# Patient Record
Sex: MALE | Race: WHITE | NOT HISPANIC OR LATINO
[De-identification: names, ages, dates, MRNs, and addresses within clinical notes are randomized per-mention and may not be internally consistent; named-entity substitution may affect disease eponyms.]

---

## 2017-05-28 ENCOUNTER — TRANSCRIPTION ENCOUNTER (OUTPATIENT)
Age: 27
End: 2017-05-28

## 2018-07-21 ENCOUNTER — TRANSCRIPTION ENCOUNTER (OUTPATIENT)
Age: 28
End: 2018-07-21

## 2018-09-18 ENCOUNTER — TRANSCRIPTION ENCOUNTER (OUTPATIENT)
Age: 28
End: 2018-09-18

## 2019-04-29 ENCOUNTER — EMERGENCY (EMERGENCY)
Facility: HOSPITAL | Age: 29
LOS: 1 days | Discharge: HOME | End: 2019-04-29
Attending: EMERGENCY MEDICINE
Payer: MEDICAID

## 2019-04-29 VITALS
TEMPERATURE: 98 F | DIASTOLIC BLOOD PRESSURE: 90 MMHG | HEART RATE: 63 BPM | SYSTOLIC BLOOD PRESSURE: 153 MMHG | WEIGHT: 250 LBS | HEIGHT: 70 IN | RESPIRATION RATE: 20 BRPM | OXYGEN SATURATION: 100 %

## 2019-04-29 LAB
APPEARANCE UR: CLEAR — SIGNIFICANT CHANGE UP
BACTERIA # UR AUTO: ABNORMAL
BILIRUB UR-MCNC: NEGATIVE — SIGNIFICANT CHANGE UP
COLOR SPEC: YELLOW — SIGNIFICANT CHANGE UP
DIFF PNL FLD: ABNORMAL
EPI CELLS # UR: SIGNIFICANT CHANGE UP
GLUCOSE UR QL: NEGATIVE MG/DL — SIGNIFICANT CHANGE UP
KETONES UR-MCNC: ABNORMAL
LEUKOCYTE ESTERASE UR-ACNC: NEGATIVE — SIGNIFICANT CHANGE UP
NITRITE UR-MCNC: NEGATIVE — SIGNIFICANT CHANGE UP
PH UR: 5 — SIGNIFICANT CHANGE UP (ref 5–8)
PROT UR-MCNC: 30 MG/DL
RBC CASTS # UR COMP ASSIST: ABNORMAL /HPF (ref 0–4)
SP GR SPEC: 1.02 — SIGNIFICANT CHANGE UP (ref 1.01–1.02)
UROBILINOGEN FLD QL: 1 MG/DL
WBC UR QL: SIGNIFICANT CHANGE UP

## 2019-04-29 PROCEDURE — 96372 THER/PROPH/DIAG INJ SC/IM: CPT

## 2019-04-29 PROCEDURE — 87086 URINE CULTURE/COLONY COUNT: CPT

## 2019-04-29 PROCEDURE — 74176 CT ABD & PELVIS W/O CONTRAST: CPT | Mod: 26

## 2019-04-29 PROCEDURE — 99284 EMERGENCY DEPT VISIT MOD MDM: CPT | Mod: 25

## 2019-04-29 PROCEDURE — 81001 URINALYSIS AUTO W/SCOPE: CPT

## 2019-04-29 PROCEDURE — 99284 EMERGENCY DEPT VISIT MOD MDM: CPT

## 2019-04-29 PROCEDURE — 74176 CT ABD & PELVIS W/O CONTRAST: CPT

## 2019-04-29 RX ORDER — ONDANSETRON 8 MG/1
4 TABLET, FILM COATED ORAL ONCE
Qty: 0 | Refills: 0 | Status: COMPLETED | OUTPATIENT
Start: 2019-04-29 | End: 2019-04-29

## 2019-04-29 RX ORDER — KETOROLAC TROMETHAMINE 30 MG/ML
15 SYRINGE (ML) INJECTION ONCE
Qty: 0 | Refills: 0 | Status: DISCONTINUED | OUTPATIENT
Start: 2019-04-29 | End: 2019-04-29

## 2019-04-29 RX ADMIN — ONDANSETRON 4 MILLIGRAM(S): 8 TABLET, FILM COATED ORAL at 17:47

## 2019-04-29 RX ADMIN — Medication 15 MILLIGRAM(S): at 17:47

## 2019-04-29 NOTE — ED STATDOCS - CLINICAL SUMMARY MEDICAL DECISION MAKING FREE TEXT BOX
Pain improving, no nausea now, one episode of vomiting, will do CT renal, obtains labs, and re-assess

## 2019-04-29 NOTE — ED STATDOCS - PHYSICAL EXAMINATION
Gen: No acute distress, non toxic  HEENT: Mucous membranes moist, pink conjunctivae, EOMI  CV: RRR  Resp: CTAB, normal rate and effort  GI: Abdomen soft, NT, ND. No rebound, no guarding. No CVA tenderness  Neuro: A&O x 3, moving all 4 extremities  : testicle non tender, positive Cremaster, no abnormality

## 2019-04-29 NOTE — ED ADULT TRIAGE NOTE - CHIEF COMPLAINT QUOTE
back pain while in the shower, no injury, radiates to the groin.  lt side flank pain, possible kidney stone

## 2019-04-29 NOTE — ED STATDOCS - NS ED ROS FT
ROS: +back pain +abdominal pain, + L testicle pain +vomiting. No fever/chills. No chest pain. No SOB/cough. No N/D. No dysuria/frequency.  No headache. No Dizziness. No rashes. No numbness/weakness.

## 2019-04-29 NOTE — ED STATDOCS - OBJECTIVE STATEMENT
28 y/o M pt with no significant PMHx presents to the ED c/o sudden back pain onset about 45 minutes ago. The pain radiates to his abdomen and L testicle. He has not taken anything for the pain. Pt took a shower to relieve the pain which provided minimal to no relief. Nothing worsens or relieves his pain. Reports vomiting. Denies blood in urine, passing stone, fever, chills, CP, SOB, nausea, diarrhea or HA. No further complaints at this time.

## 2019-04-29 NOTE — ED STATDOCS - ATTENDING CONTRIBUTION TO CARE
Marko: I performed a face to face bedside interview with patient regarding history of present illness, review of symptoms and past medical history. I completed an independent physical exam and ordered tests/medications as needed.  I have discussed patient's plan of care with advanced care provider. The advanced care provider assisted in  executing the discussed plan. I was available for any questions or issues that may have arose during the execution of the plan of care.

## 2019-04-30 LAB
CULTURE RESULTS: NO GROWTH — SIGNIFICANT CHANGE UP
SPECIMEN SOURCE: SIGNIFICANT CHANGE UP

## 2020-07-26 ENCOUNTER — TRANSCRIPTION ENCOUNTER (OUTPATIENT)
Age: 30
End: 2020-07-26

## 2020-09-03 ENCOUNTER — TRANSCRIPTION ENCOUNTER (OUTPATIENT)
Age: 30
End: 2020-09-03

## 2021-01-09 NOTE — ED ADULT NURSE NOTE - OBJECTIVE STATEMENT
----- Message from Norm Vegas MD sent at 1/9/2021  8:06 AM CST -----  Please inform the patient that the urine culture is not consistent with infection.  She should finish the antibiotic and follow up with the pcp if symptoms persist or if no improvement.  Thank you.   Pt is here with c/o L flank pain that started this morning while in the shower.  Pt also c/o L testicle pain.  Denies N/v denies urinary symptoms.  Pt has no hx of kidney stones but mother and sister both have.

## 2021-03-05 ENCOUNTER — TRANSCRIPTION ENCOUNTER (OUTPATIENT)
Age: 31
End: 2021-03-05

## 2021-06-18 ENCOUNTER — TRANSCRIPTION ENCOUNTER (OUTPATIENT)
Age: 31
End: 2021-06-18

## 2021-10-03 ENCOUNTER — TRANSCRIPTION ENCOUNTER (OUTPATIENT)
Age: 31
End: 2021-10-03

## 2022-07-27 ENCOUNTER — EMERGENCY (EMERGENCY)
Facility: HOSPITAL | Age: 32
LOS: 1 days | Discharge: HOME | End: 2022-07-27
Attending: EMERGENCY MEDICINE
Payer: MEDICAID

## 2022-07-27 VITALS
HEART RATE: 65 BPM | DIASTOLIC BLOOD PRESSURE: 82 MMHG | WEIGHT: 225.09 LBS | HEIGHT: 70 IN | SYSTOLIC BLOOD PRESSURE: 133 MMHG | TEMPERATURE: 98 F | RESPIRATION RATE: 16 BRPM | OXYGEN SATURATION: 97 %

## 2022-07-27 PROCEDURE — 73130 X-RAY EXAM OF HAND: CPT | Mod: 26,RT

## 2022-07-27 PROCEDURE — 12002 RPR S/N/AX/GEN/TRNK2.6-7.5CM: CPT

## 2022-07-27 PROCEDURE — 99284 EMERGENCY DEPT VISIT MOD MDM: CPT | Mod: 25

## 2022-07-27 RX ORDER — OXYCODONE AND ACETAMINOPHEN 5; 325 MG/1; MG/1
1 TABLET ORAL ONCE
Refills: 0 | Status: DISCONTINUED | OUTPATIENT
Start: 2022-07-27 | End: 2022-07-27

## 2022-07-27 RX ORDER — TETANUS TOXOID, REDUCED DIPHTHERIA TOXOID AND ACELLULAR PERTUSSIS VACCINE, ADSORBED 5; 2.5; 8; 8; 2.5 [IU]/.5ML; [IU]/.5ML; UG/.5ML; UG/.5ML; UG/.5ML
0.5 SUSPENSION INTRAMUSCULAR ONCE
Refills: 0 | Status: COMPLETED | OUTPATIENT
Start: 2022-07-27 | End: 2022-07-27

## 2022-07-27 RX ORDER — CEPHALEXIN 500 MG
500 CAPSULE ORAL ONCE
Refills: 0 | Status: COMPLETED | OUTPATIENT
Start: 2022-07-27 | End: 2022-07-27

## 2022-07-27 RX ADMIN — OXYCODONE AND ACETAMINOPHEN 1 TABLET(S): 5; 325 TABLET ORAL at 22:51

## 2022-07-27 RX ADMIN — TETANUS TOXOID, REDUCED DIPHTHERIA TOXOID AND ACELLULAR PERTUSSIS VACCINE, ADSORBED 0.5 MILLILITER(S): 5; 2.5; 8; 8; 2.5 SUSPENSION INTRAMUSCULAR at 22:51

## 2022-07-27 RX ADMIN — Medication 500 MILLIGRAM(S): at 22:51

## 2022-07-27 NOTE — ED ADULT TRIAGE NOTE - CHIEF COMPLAINT QUOTE
BIBEMS from home after accidently hitting right first and second finger with an axe while chopping wood.  Deep laceration noted to both fingers with fatty tissue exposed.  Bleeding controlled at this time. No use of blood thinners.

## 2022-07-28 PROBLEM — Z78.9 OTHER SPECIFIED HEALTH STATUS: Chronic | Status: ACTIVE | Noted: 2019-04-29

## 2022-07-28 PROCEDURE — 73130 X-RAY EXAM OF HAND: CPT

## 2022-07-28 PROCEDURE — 99283 EMERGENCY DEPT VISIT LOW MDM: CPT | Mod: 25

## 2022-07-28 PROCEDURE — 12042 INTMD RPR N-HF/GENIT2.6-7.5: CPT

## 2022-07-28 PROCEDURE — 90715 TDAP VACCINE 7 YRS/> IM: CPT

## 2022-07-28 PROCEDURE — 90471 IMMUNIZATION ADMIN: CPT | Mod: XU

## 2022-07-28 RX ORDER — CEPHALEXIN 500 MG
1 CAPSULE ORAL
Qty: 28 | Refills: 0
Start: 2022-07-28 | End: 2022-08-03

## 2022-07-28 NOTE — ED PROVIDER NOTE - CARE PROVIDER_API CALL
Katia Stiles (DO)  Orthopaedic Surgery  [address] [place] [address]  Phone: [phone]  Fax: [phone]  Follow Up Time:    Katia Stiles (DO)  Orthopaedic Surgery  [address] [place] [address]  Phone: [phone]  Fax: [phone]  Follow Up Time:     Vivienne Ca)  Orthopedics  [address] [place] [address], [facility] [address]  [place] [address]  Phone: [phone]  Fax: [phone]  Follow Up Time: 4-6 Days

## 2022-07-28 NOTE — ED PROVIDER NOTE - WR ORDER ID 1
[id number] Complex Repair And Rhombic Flap Text: The defect edges were debeveled with a #15 scalpel blade.  The primary defect was closed partially with a complex linear closure.  Given the location of the remaining defect, shape of the defect and the proximity to free margins a rhombic flap was deemed most appropriate for complete closure of the defect.  Using a sterile surgical marker, an appropriate advancement flap was drawn incorporating the defect and placing the expected incisions within the relaxed skin tension lines where possible.    The area thus outlined was incised deep to adipose tissue with a #15 scalpel blade.  The skin margins were undermined to an appropriate distance in all directions utilizing iris scissors.

## 2022-07-28 NOTE — ED PROVIDER NOTE - CARE PROVIDERS DIRECT ADDRESSES
,DirectAddress_Unknown ,DirectAddress_Unknown,[email or web address]

## 2022-07-28 NOTE — ED PROVIDER NOTE - OBJECTIVE STATEMENT
32 year old male with no med hx presented to ED c/o hand injury. Pt states his brother was trying to remove a piece of metal pipe from a tree, accidentally hit his hand with an axe. he admits to decrease in ROM, pain. unknown last tetanus

## 2022-07-28 NOTE — ED PROVIDER NOTE - NS ED ATTENDING STATEMENT MOD
This was a shared visit with the SAT[facility]. I reviewed and verified the documentation and independently performed the documented:

## 2022-07-28 NOTE — ED PROVIDER NOTE - PATIENT PORTAL LINK FT
You can access the FollowMyHealth Patient Portal offered by [facility] by registering at the following website: [email or web address]. By joining [facility]’s FollowMyHealth portal, you will also be able to view your health information using other applications (apps) compatible with our system.

## 2022-07-28 NOTE — ED PROVIDER NOTE - PHYSICAL EXAMINATION
+ 2nd finger deep laceration 7mm across proximal phalynx unable to extend/flex   + 3rd finger 4mm lac

## 2022-07-28 NOTE — ED PROVIDER NOTE - PROVIDER TOKENS
PROVIDER:[TOKEN:[[id number]]] PROVIDER:[TOKEN:[[id number]]],PROVIDER:[TOKEN:[[id number]],FOLLOWUP:[4-6 Days]]

## 2022-07-29 RX ORDER — OXYCODONE AND ACETAMINOPHEN 5; 325 MG/1; MG/1
1 TABLET ORAL
Qty: 8 | Refills: 0
Start: 2022-07-29 | End: 2022-07-30

## 2022-08-02 ENCOUNTER — APPOINTMENT (OUTPATIENT)
Dept: ORTHOPEDIC SURGERY | Facility: CLINIC | Age: 32
End: 2022-08-02

## 2022-08-02 VITALS
BODY MASS INDEX: 37.03 KG/M2 | DIASTOLIC BLOOD PRESSURE: 91 MMHG | HEART RATE: 70 BPM | HEIGHT: 69 IN | SYSTOLIC BLOOD PRESSURE: 147 MMHG | WEIGHT: 250 LBS

## 2022-08-02 DIAGNOSIS — F17.200 NICOTINE DEPENDENCE, UNSPECIFIED, UNCOMPLICATED: ICD-10-CM

## 2022-08-02 DIAGNOSIS — Z78.9 OTHER SPECIFIED HEALTH STATUS: ICD-10-CM

## 2022-08-02 PROCEDURE — 99205 OFFICE O/P NEW HI 60 MIN: CPT | Mod: 25

## 2022-08-02 PROCEDURE — 29125 APPL SHORT ARM SPLINT STATIC: CPT | Mod: RT

## 2022-08-02 RX ORDER — CEPHALEXIN 500 MG/1
500 CAPSULE ORAL
Qty: 28 | Refills: 0 | Status: ACTIVE | COMMUNITY
Start: 2022-07-28

## 2022-08-02 RX ORDER — OXYCODONE AND ACETAMINOPHEN 5; 325 MG/1; MG/1
5-325 TABLET ORAL
Qty: 8 | Refills: 0 | Status: ACTIVE | COMMUNITY
Start: 2022-07-29

## 2022-08-03 ENCOUNTER — OUTPATIENT (OUTPATIENT)
Dept: OUTPATIENT SERVICES | Facility: HOSPITAL | Age: 32
LOS: 1 days | End: 2022-08-03
Payer: MEDICAID

## 2022-08-03 VITALS
SYSTOLIC BLOOD PRESSURE: 124 MMHG | WEIGHT: 300.05 LBS | HEIGHT: 69 IN | OXYGEN SATURATION: 98 % | HEART RATE: 70 BPM | DIASTOLIC BLOOD PRESSURE: 74 MMHG | TEMPERATURE: 97 F | RESPIRATION RATE: 18 BRPM

## 2022-08-03 DIAGNOSIS — S62.610A DISPLACED FRACTURE OF PROXIMAL PHALANX OF RIGHT INDEX FINGER, INITIAL ENCOUNTER FOR CLOSED FRACTURE: ICD-10-CM

## 2022-08-03 DIAGNOSIS — Z13.89 ENCOUNTER FOR SCREENING FOR OTHER DISORDER: ICD-10-CM

## 2022-08-03 DIAGNOSIS — Z29.9 ENCOUNTER FOR PROPHYLACTIC MEASURES, UNSPECIFIED: ICD-10-CM

## 2022-08-03 DIAGNOSIS — S66.901S: ICD-10-CM

## 2022-08-03 DIAGNOSIS — S66.991D: ICD-10-CM

## 2022-08-03 DIAGNOSIS — S62.610B DISPLACED FRACTURE OF PROXIMAL PHALANX OF RIGHT INDEX FINGER, INITIAL ENCOUNTER FOR OPEN FRACTURE: ICD-10-CM

## 2022-08-03 DIAGNOSIS — Z01.818 ENCOUNTER FOR OTHER PREPROCEDURAL EXAMINATION: ICD-10-CM

## 2022-08-03 DIAGNOSIS — S66.901D: ICD-10-CM

## 2022-08-03 LAB
A1C WITH ESTIMATED AVERAGE GLUCOSE RESULT: 4.8 % — SIGNIFICANT CHANGE UP (ref 4–5.6)
ANION GAP SERPL CALC-SCNC: 14 MMOL/L — SIGNIFICANT CHANGE UP (ref 5–17)
APTT BLD: 30.7 SEC — SIGNIFICANT CHANGE UP (ref 27.5–35.5)
BASOPHILS # BLD AUTO: 0.1 K/UL — SIGNIFICANT CHANGE UP (ref 0–0.2)
BASOPHILS NFR BLD AUTO: 1 % — SIGNIFICANT CHANGE UP (ref 0–2)
BUN SERPL-MCNC: 8.9 MG/DL — SIGNIFICANT CHANGE UP (ref 8–20)
CALCIUM SERPL-MCNC: 10.2 MG/DL — SIGNIFICANT CHANGE UP (ref 8.4–10.5)
CHLORIDE SERPL-SCNC: 103 MMOL/L — SIGNIFICANT CHANGE UP (ref 98–107)
CO2 SERPL-SCNC: 23 MMOL/L — SIGNIFICANT CHANGE UP (ref 22–29)
CREAT SERPL-MCNC: 0.72 MG/DL — SIGNIFICANT CHANGE UP (ref 0.5–1.3)
EGFR: 125 ML/MIN/1.73M2 — SIGNIFICANT CHANGE UP
EOSINOPHIL # BLD AUTO: 0.35 K/UL — SIGNIFICANT CHANGE UP (ref 0–0.5)
EOSINOPHIL NFR BLD AUTO: 3.4 % — SIGNIFICANT CHANGE UP (ref 0–6)
ESTIMATED AVERAGE GLUCOSE: 91 MG/DL — SIGNIFICANT CHANGE UP (ref 68–114)
GLUCOSE SERPL-MCNC: 81 MG/DL — SIGNIFICANT CHANGE UP (ref 70–99)
HCT VFR BLD CALC: 46.2 % — SIGNIFICANT CHANGE UP (ref 39–50)
HGB BLD-MCNC: 16.8 G/DL — SIGNIFICANT CHANGE UP (ref 13–17)
IMM GRANULOCYTES NFR BLD AUTO: 0.3 % — SIGNIFICANT CHANGE UP (ref 0–1.5)
INR BLD: 1.08 RATIO — SIGNIFICANT CHANGE UP (ref 0.88–1.16)
LYMPHOCYTES # BLD AUTO: 1.94 K/UL — SIGNIFICANT CHANGE UP (ref 1–3.3)
LYMPHOCYTES # BLD AUTO: 19.1 % — SIGNIFICANT CHANGE UP (ref 13–44)
MCHC RBC-ENTMCNC: 32.2 PG — SIGNIFICANT CHANGE UP (ref 27–34)
MCHC RBC-ENTMCNC: 36.4 GM/DL — HIGH (ref 32–36)
MCV RBC AUTO: 88.7 FL — SIGNIFICANT CHANGE UP (ref 80–100)
MONOCYTES # BLD AUTO: 0.66 K/UL — SIGNIFICANT CHANGE UP (ref 0–0.9)
MONOCYTES NFR BLD AUTO: 6.5 % — SIGNIFICANT CHANGE UP (ref 2–14)
NEUTROPHILS # BLD AUTO: 7.07 K/UL — SIGNIFICANT CHANGE UP (ref 1.8–7.4)
NEUTROPHILS NFR BLD AUTO: 69.7 % — SIGNIFICANT CHANGE UP (ref 43–77)
PLATELET # BLD AUTO: 270 K/UL — SIGNIFICANT CHANGE UP (ref 150–400)
POTASSIUM SERPL-MCNC: 4 MMOL/L — SIGNIFICANT CHANGE UP (ref 3.5–5.3)
POTASSIUM SERPL-SCNC: 4 MMOL/L — SIGNIFICANT CHANGE UP (ref 3.5–5.3)
PROTHROM AB SERPL-ACNC: 12.5 SEC — SIGNIFICANT CHANGE UP (ref 10.5–13.4)
RBC # BLD: 5.21 M/UL — SIGNIFICANT CHANGE UP (ref 4.2–5.8)
RBC # FLD: 11.9 % — SIGNIFICANT CHANGE UP (ref 10.3–14.5)
SODIUM SERPL-SCNC: 140 MMOL/L — SIGNIFICANT CHANGE UP (ref 135–145)
WBC # BLD: 10.15 K/UL — SIGNIFICANT CHANGE UP (ref 3.8–10.5)
WBC # FLD AUTO: 10.15 K/UL — SIGNIFICANT CHANGE UP (ref 3.8–10.5)

## 2022-08-03 PROCEDURE — 85610 PROTHROMBIN TIME: CPT

## 2022-08-03 PROCEDURE — 87641 MR-STAPH DNA AMP PROBE: CPT

## 2022-08-03 PROCEDURE — 36415 COLL VENOUS BLD VENIPUNCTURE: CPT

## 2022-08-03 PROCEDURE — U0003: CPT

## 2022-08-03 PROCEDURE — 85025 COMPLETE CBC W/AUTO DIFF WBC: CPT

## 2022-08-03 PROCEDURE — 85730 THROMBOPLASTIN TIME PARTIAL: CPT

## 2022-08-03 PROCEDURE — U0005: CPT

## 2022-08-03 PROCEDURE — 87640 STAPH A DNA AMP PROBE: CPT

## 2022-08-03 PROCEDURE — G0463: CPT

## 2022-08-03 PROCEDURE — 83036 HEMOGLOBIN GLYCOSYLATED A1C: CPT

## 2022-08-03 PROCEDURE — 80048 BASIC METABOLIC PNL TOTAL CA: CPT

## 2022-08-03 RX ORDER — MUPIROCIN 20 MG/G
1 OINTMENT TOPICAL
Qty: 1 | Refills: 0
Start: 2022-08-03 | End: 2022-08-07

## 2022-08-03 NOTE — H&P PST ADULT - ASSESSMENT
This is a  morbidly obese 32 year old RHD male, accompanied by Sister, in NAD denies PMH presents today for PST. Patient accidentally cut his finger at home with a Ax on 22. He was seen in [facility] ER, imaging reportedly showed index finger proximal phalanx fracture. The wound was cared for and sutured. He was placed on oral antibiotics and placed in a splint and referred to orthopedics for follow-up. He denies fevers or chills.  Denies drainage or erythema to wound. Denies numbness and tingling. Currently stitches are open to ai and splint in use. Now scheduled for open reduction internal fixation right index finger proximal phalanx fracture, possible extensor tendon repair on 22 with Dr. Ca.     CAPRINI SCORE    AGE RELATED RISK FACTORS                                                             [ ] Age 41-60 years                                            (1 Point)  [ ] Age: 61-74 years                                           (2 Points)                 [ ] Age= 75 years                                                (3 Points)             DISEASE RELATED RISK FACTORS                                                       [ ] Edema in the lower extremities                 (1 Point)                     [ ] Varicose veins                                               (1 Point)                                 [X ] BMI > 25 Kg/m2                                            (1 Point)                                  [ ] Serious infection (ie PNA)                            (1 Point)                     [ ] Lung disease ( COPD, Emphysema)            (1 Point)                                                                          [ ] Acute myocardial infarction                         (1 Point)                  [ ] Congestive heart failure (in the previous month)  (1 Point)         [ ] Inflammatory bowel disease                            (1 Point)                  [ ] Central venous access, PICC or Port               (2 points)       (within the last month)                                                                [ ] Stroke (in the previous month)                        (5 Points)    [ ] Previous or present malignancy                       (2 points)                                                                                                                                                         HEMATOLOGY RELATED FACTORS                                                         [ ] Prior episodes of VTE                                     (3 Points)                     [ ] Positive family history for VTE                      (3 Points)                  [ ] Prothrombin 39027 A                                     (3 Points)                     [ ] Factor V Leiden                                                (3 Points)                        [ ] Lupus anticoagulants                                      (3 Points)                                                           [ ] Anticardiolipin antibodies                              (3 Points)                                                       [ ] High homocysteine in the blood                   (3 Points)                                             [ ] Other congenital or acquired thrombophilia      (3 Points)                                                [ ] Heparin induced thrombocytopenia                  (3 Points)                                        MOBILITY RELATED FACTORS  [ ] Bed rest                                                         (1 Point)  [ ] Plaster cast                                                    (2 points)  [ ] Bed bound for more than 72 hours           (2 Points)    GENDER SPECIFIC FACTORS  [ ] Pregnancy or had a baby within the last month   (1 Point)  [ ] Post-partum < 6 weeks                                   (1 Point)  [ ] Hormonal therapy  or oral contraception   (1 Point)  [ ] History of pregnancy complications              (1 point)  [ ] Unexplained or recurrent              (1 Point)    OTHER RISK FACTORS                                           (1 Point)  [ X] BMI >40, smoking, diabetes requiring insulin, chemotherapy  blood transfusions and length of surgery over 2 hours    SURGERY RELATED RISK FACTORS  [ ]  Section within the last month     (1 Point)  [ ] Minor surgery                                                  (1 Point)  [ ] Arthroscopic surgery                                       (2 Points)  [X ] Planned major surgery lasting more            (2 Points)      than 45 minutes     [ ] Elective hip or knee joint replacement       (5 points)       surgery                                                TRAUMA RELATED RISK FACTORS  [ ] Fracture of the hip, pelvis, or leg                       (5 Points)  [ ] Spinal cord injury resulting in paralysis             (5 points)       (in the previous month)    [ ] Paralysis  (less than 1 month)                             (5 Points)  [ ] Multiple Trauma within 1 month                        (5 Points)    Total Score [  4      ]    Caprini Score 0-2: Low Risk, NO VTE prophylaxis required for most patients, encourage ambulation  Caprini Score 3-6: Moderate Risk , pharmacologic VTE prophylaxis is indicated for most patients (in the absence of contraindications)  Caprini Score Greater than or =7: High risk, pharmocologic VTE prophylaxis indicated for most patients (in the absence of contraindications)    OPIOID RISK TOOL    SON EACH BOX THAT APPLIES AND ADD TOTALS AT THE END    FAMILY HISTORY OF SUBSTANCE ABUSE                 FEMALE         MALE                                                Alcohol                             [  ]1 pt          [  ]3pts                                               Illegal Durgs                     [  ]2 pts        [  ]3pts                                               Rx Drugs                           [  ]4 pts        [  ]4 pts    PERSONAL HISTORY OF SUBSTANCE ABUSE                                                                                          Alcohol                             [  ]3 pts       [  ]3 pts                                               Illegal Drugs                     [  ]4 pts        [  ]4 pts                                               Rx Drugs                           [  ]5 pts        [  ]5 pts    AGE BETWEEN 16-45 YEARS                                      [  ]1 pt         [X  ]1 pt    HISTORY OF PREADOLESCENT   SEXUAL ABUSE                                                             [  ]3 pts        [  ]0pts    PSYCHOLOGICAL DISEASE                     ADD, OCD, Bipolar, Schizophrenia        [  ]2 pts         [  ]2 pts                      Depression                                               [  ]1 pt           [  ]1 pt           SCORING TOTAL   (add numbers and type here)              (*1**)                                     A score of 3 or lower indicated LOW risk for future opioid abuse  A score of 4 to 7 indicated moderate risk for future opioid abuse  A score of 8 or higher indicates a high risk for opioid abuse

## 2022-08-03 NOTE — H&P PST ADULT - ATTENDING COMMENTS
I examined the patient in presurgical holding with sister at bedside and reviewed the above H&P and relevant lab results.  Patient has right index finger displaced fracture and extensor laceration after an ax injury.  He was indicated for right index fracture ORIF and extensor tendon repair.  Risks, benefits, alternatives were discussed in detail with the patient in the office and he would like to proceed with surgery.  He had the opportunity to ask additional questions today and informed written consent was obtained from the patient.  Surgical side and site were confirmed and marked.  Boarded to OR.

## 2022-08-03 NOTE — H&P PST ADULT - LAB RESULTS AND INTERPRETATION
results pending results pending  8/3/22 19:53 All available labs noted as documented, all abnormal labs noted as documented. MRSA/MSSA, COVID PCR pending. Maciel MS, FNP-BC

## 2022-08-03 NOTE — H&P PST ADULT - PROBLEM SELECTOR PLAN 2
Labs and MRSA/MSSA performed.  Scheduled for open reduction internal fixation right index finger proximal phalanx fracture, possible extensor tendon repair on 8/5/22 with Dr. Ca.   Written and verbal instructions provided.  Patient educated on surgical scrub, COVID testing performed in PST, preadmission instructions, and day of procedure medications, verbalizes understanding, teach back method utilized.  Patient treated prophylactically for MRSA/MSSA  Patient instructed to stop ASA/Herbals or anti-inflammatory meds

## 2022-08-03 NOTE — H&P PST ADULT - MUSCULOSKELETAL COMMENTS
right hand and right index finger right hand warm, + sensation, edema noted, pain with movement of fingers

## 2022-08-03 NOTE — H&P PST ADULT - PROBLEM SELECTOR PLAN 1
Labs and MRSA/MSSA performed.  Scheduled for open reduction internal fixation right index finger proximal phalanx fracture, possible extensor tendon repair on 8/5/22 with Dr. Ca.   Written and verbal instructions provided.  Patient educated on surgical scrub, COVID testing performed in PST, liquids before surgery, preadmission instructions, and day of procedure medications, verbalizes understanding, teach back method utilized.  Patient treated prophylactically for MRSA/MSSA  Patient instructed to stop ASA/Herbals or anti-inflammatory meds

## 2022-08-03 NOTE — H&P PST ADULT - PROBLEM SELECTOR PLAN 3
CAP score 4 patient Moderate Risk,  SCDs ordered for day of procedure.  Surgical team to assess need for VTE prophylaxis

## 2022-08-03 NOTE — H&P PST ADULT - HISTORY OF PRESENT ILLNESS
32 year old RHD  accompanied by Sister, denies PMH presents today for PST. Patient accidentally cut his finger at home with a Ax on 7/27/22. He was seen in [facility] ER, imaging reportedly showed index finger proximal phalanx fracture. The wound was cared for and sutured. He was placed on oral antibiotics and placed in a splint and referred to orthopedics for follow-up. He denies fevers or chills.  Denies drainage or erythema to wound. Denies numbness and tingling. Currently stitches are open to ai and splint in use.      32 year old RHD  accompanied by Sister, denies PMH presents today for PST. Patient accidentally cut his finger at home with a Ax on 7/27/22. He was seen in [facility] ER, imaging reportedly showed index finger proximal phalanx fracture. The wound was cared for and sutured. He was placed on oral antibiotics and placed in a splint and referred to orthopedics for follow-up. He denies fevers or chills.  Denies drainage or erythema to wound. Denies numbness and tingling. Currently stitches are open to ai and splint in use. Now scheduled for open reduction internal fixation right index finger proximal phalanx fracture, possible extensor tendon repair on 8/5/22 with Dr. Ca.

## 2022-08-03 NOTE — H&P PST ADULT - NEUROLOGICAL
negative sensation intact/responds to pain/responds to verbal commands/cranial nerves intact/no spontaneous movement

## 2022-08-03 NOTE — H&P PST ADULT - PROBLEM SELECTOR PROBLEM 1
Other injury of unspecified muscle, fascia and tendon at wrist and hand level, right hand, subsequent encounter

## 2022-08-04 ENCOUNTER — TRANSCRIPTION ENCOUNTER (OUTPATIENT)
Age: 32
End: 2022-08-04

## 2022-08-04 LAB
MRSA PCR RESULT.: SIGNIFICANT CHANGE UP
S AUREUS DNA NOSE QL NAA+PROBE: SIGNIFICANT CHANGE UP
SARS-COV-2 RNA SPEC QL NAA+PROBE: SIGNIFICANT CHANGE UP

## 2022-08-05 ENCOUNTER — OUTPATIENT (OUTPATIENT)
Dept: INPATIENT UNIT | Facility: HOSPITAL | Age: 32
LOS: 1 days | End: 2022-08-05
Payer: MEDICAID

## 2022-08-05 ENCOUNTER — TRANSCRIPTION ENCOUNTER (OUTPATIENT)
Age: 32
End: 2022-08-05

## 2022-08-05 ENCOUNTER — APPOINTMENT (OUTPATIENT)
Dept: ORTHOPEDIC SURGERY | Facility: HOSPITAL | Age: 32
End: 2022-08-05

## 2022-08-05 VITALS
SYSTOLIC BLOOD PRESSURE: 146 MMHG | TEMPERATURE: 98 F | DIASTOLIC BLOOD PRESSURE: 73 MMHG | WEIGHT: 300.05 LBS | RESPIRATION RATE: 20 BRPM | HEIGHT: 69 IN | HEART RATE: 77 BPM | OXYGEN SATURATION: 99 %

## 2022-08-05 VITALS
SYSTOLIC BLOOD PRESSURE: 128 MMHG | OXYGEN SATURATION: 100 % | HEART RATE: 88 BPM | DIASTOLIC BLOOD PRESSURE: 84 MMHG | TEMPERATURE: 98 F | RESPIRATION RATE: 18 BRPM

## 2022-08-05 DIAGNOSIS — Z01.818 ENCOUNTER FOR OTHER PREPROCEDURAL EXAMINATION: ICD-10-CM

## 2022-08-05 LAB
GLUCOSE BLDC GLUCOMTR-MCNC: 110 MG/DL — HIGH (ref 70–99)
GLUCOSE BLDC GLUCOMTR-MCNC: 92 MG/DL — SIGNIFICANT CHANGE UP (ref 70–99)
GLUCOSE BLDC GLUCOMTR-MCNC: 97 MG/DL — SIGNIFICANT CHANGE UP (ref 70–99)

## 2022-08-05 PROCEDURE — 26418 REPAIR FINGER TENDON: CPT | Mod: F6

## 2022-08-05 PROCEDURE — C1713: CPT

## 2022-08-05 PROCEDURE — 26735 TREAT FINGER FRACTURE EACH: CPT | Mod: F6

## 2022-08-05 PROCEDURE — 26410 REPAIR HAND TENDON: CPT | Mod: F6

## 2022-08-05 PROCEDURE — 82962 GLUCOSE BLOOD TEST: CPT

## 2022-08-05 DEVICE — IMPLANTABLE DEVICE: Type: IMPLANTABLE DEVICE | Status: FUNCTIONAL

## 2022-08-05 DEVICE — KWIRE .062X9MM: Type: IMPLANTABLE DEVICE | Status: FUNCTIONAL

## 2022-08-05 RX ORDER — SODIUM CHLORIDE 9 MG/ML
1000 INJECTION, SOLUTION INTRAVENOUS
Refills: 0 | Status: DISCONTINUED | OUTPATIENT
Start: 2022-08-05 | End: 2022-08-05

## 2022-08-05 RX ORDER — DIPHENHYDRAMINE HCL 50 MG
25 CAPSULE ORAL ONCE
Refills: 0 | Status: COMPLETED | OUTPATIENT
Start: 2022-08-05 | End: 2022-08-05

## 2022-08-05 RX ORDER — ACETAMINOPHEN 500 MG
975 TABLET ORAL ONCE
Refills: 0 | Status: COMPLETED | OUTPATIENT
Start: 2022-08-05 | End: 2022-08-05

## 2022-08-05 RX ORDER — CEFAZOLIN SODIUM 1 G
2000 VIAL (EA) INJECTION ONCE
Refills: 0 | Status: DISCONTINUED | OUTPATIENT
Start: 2022-08-05 | End: 2022-08-05

## 2022-08-05 RX ORDER — CEFAZOLIN SODIUM 1 G
3000 VIAL (EA) INJECTION ONCE
Refills: 0 | Status: DISCONTINUED | OUTPATIENT
Start: 2022-08-05 | End: 2022-08-05

## 2022-08-05 RX ORDER — CEPHALEXIN 500 MG
1 CAPSULE ORAL
Qty: 20 | Refills: 0
Start: 2022-08-05 | End: 2022-08-09

## 2022-08-05 RX ORDER — OXYCODONE HYDROCHLORIDE 5 MG/1
1 TABLET ORAL
Qty: 8 | Refills: 0
Start: 2022-08-05 | End: 2022-08-06

## 2022-08-05 RX ORDER — FENTANYL CITRATE 50 UG/ML
25 INJECTION INTRAVENOUS
Refills: 0 | Status: DISCONTINUED | OUTPATIENT
Start: 2022-08-05 | End: 2022-08-05

## 2022-08-05 RX ORDER — ONDANSETRON 8 MG/1
4 TABLET, FILM COATED ORAL ONCE
Refills: 0 | Status: COMPLETED | OUTPATIENT
Start: 2022-08-05 | End: 2022-08-05

## 2022-08-05 RX ORDER — FENTANYL CITRATE 50 UG/ML
50 INJECTION INTRAVENOUS
Refills: 0 | Status: DISCONTINUED | OUTPATIENT
Start: 2022-08-05 | End: 2022-08-05

## 2022-08-05 RX ADMIN — ONDANSETRON 4 MILLIGRAM(S): 8 TABLET, FILM COATED ORAL at 18:53

## 2022-08-05 RX ADMIN — Medication 975 MILLIGRAM(S): at 09:41

## 2022-08-05 RX ADMIN — Medication 25 MILLIGRAM(S): at 19:10

## 2022-08-05 NOTE — ASU DISCHARGE PLAN (ADULT/PEDIATRIC) - ASU DC SPECIAL INSTRUCTIONSFT
Follow-up with Dr. Ca in 1 week . Call office to confirm appointment.  - Do not remove splint  - Keep dressing clean and dry, may use cast bag/plastic bag to shower  - Elevate extremity  - Ice 20 minutes on, 20 minutes off

## 2022-08-05 NOTE — BRIEF OPERATIVE NOTE - NSICDXBRIEFPOSTOP_GEN_ALL_CORE_FT
POST-OP DIAGNOSIS:  Open displaced fracture of proximal phalanx of right index finger 05-Aug-2022 19:13:56  Vivienne Ca  Laceration of right index finger with tendon involvement, subsequent encounter 05-Aug-2022 19:14:27  Vivienne Ca  Injury of extensor tendon of right hand, subsequent encounter 05-Aug-2022 19:14:47  Vivienne Ca

## 2022-08-05 NOTE — BRIEF OPERATIVE NOTE - OPERATION/FINDINGS
comminuted displaced fracture of the proximal phalanx shaft of the right index finger with bone loss  reduction provisionally held with 1.1mm smooth K wires x 2  internal fixation with IMScouting 1.5 TriLock T plate, 3/7 holes, t0.8, 1.5 mm cortical and locking screws  complete transection of extensor tendon, repaired with 3-0 Ethibond using modified Del Valle and horizontal mattress sutures to achieve 8 core strands, reinforced with dorsal epitendinous 6-0 prolene  placed in volar splint to keep index finger in extension

## 2022-08-05 NOTE — BRIEF OPERATIVE NOTE - NSICDXBRIEFPREOP_GEN_ALL_CORE_FT
PRE-OP DIAGNOSIS:  Open displaced fracture of proximal phalanx of right index finger 05-Aug-2022 19:11:19  Vivienne Ca  Laceration of right index finger with tendon involvement, subsequent encounter 05-Aug-2022 19:13:09  Vivienne Ca  Injury of extensor tendon of right hand, subsequent encounter 05-Aug-2022 19:13:36  Vivienne Ca

## 2022-08-05 NOTE — ASU PREOP CHECKLIST - BSA (M2)
2.45 Unna Boot Text: An Unna boot was placed to help immobilize the limb and facilitate more rapid healing.

## 2022-08-05 NOTE — ASU DISCHARGE PLAN (ADULT/PEDIATRIC) - NS MD DC FALL RISK RISK
For information on Fall & Injury Prevention, visit: [email or web address] OR  [email or web address] OR  https://www.cdc.gov/steadi/patient.html

## 2022-08-05 NOTE — ASU DISCHARGE PLAN (ADULT/PEDIATRIC) - CARE PROVIDER_API CALL
Vivienne Ca)  Orthopedics  [address] [place] [address], [address]  [place] [address]  Phone: [phone]  Fax: [phone]  Follow Up Time:

## 2022-08-05 NOTE — BRIEF OPERATIVE NOTE - NSICDXBRIEFPROCEDURE_GEN_ALL_CORE_FT
PROCEDURES:  Open reduction and internal fixation (ORIF) of fracture of proximal phalanx 05-Aug-2022 19:10:00  Vivienne Ca  Extensor tendon repair of hand 05-Aug-2022 19:10:33  Vivienne Ca

## 2022-08-11 ENCOUNTER — APPOINTMENT (OUTPATIENT)
Dept: ORTHOPEDIC SURGERY | Facility: CLINIC | Age: 32
End: 2022-08-11

## 2022-08-11 VITALS
BODY MASS INDEX: 37.03 KG/M2 | DIASTOLIC BLOOD PRESSURE: 84 MMHG | HEART RATE: 52 BPM | SYSTOLIC BLOOD PRESSURE: 129 MMHG | HEIGHT: 69 IN | WEIGHT: 250 LBS

## 2022-08-11 PROCEDURE — 73130 X-RAY EXAM OF HAND: CPT | Mod: RT

## 2022-08-11 PROCEDURE — 99024 POSTOP FOLLOW-UP VISIT: CPT

## 2022-08-14 NOTE — HISTORY OF PRESENT ILLNESS
[Doing Well] : is doing well [de-identified] : s/p Open reduction and internal fixation of right index finger proximal phalanx shaft displaced fracture.\par  Right index finger extensor tendon repair  DOI:08/05/22\par \par  [de-identified] : 08/11/2022\par Mr. MIRIAN CHURCHILL returns with his sister for follow up about one week status post the above procedure.  Reports minimal pain at incision.  Denies fevers or chills.  Denies paresthesia.  No new complaints.  [de-identified] : GENERAL: Awake, alert, cooperative, answers questions appropriately. No acute distress.  \par SKIN: Warm, dry, intact. Color and turgor normal. \par LUNGS: Demonstrates unlabored breathing on room air with no accessory muscle use.\par EXTREMITIES: Warm and well-perfused. \par NEUROLOGICAL: Grossly intact.\par \par FOCUSED UPPER EXTREMITY EXAM:\par \par RUE:\par -Mild appropriate post op swelling in index finger\par -Incision and wound healing well with no erythema or drainage.  No signs of infection.\par -Sutures over middle finger were removed without difficulty\par -index finger extension lag resolved\par -Able to flex and extend all digits except for index finger with moderate stiffness.\par -Sensation intact in all digits.\par -All digits warm and well perfused.  [de-identified] : X-rays of right hand (3 views) were obtained in Office (08/11/2022) and reviewed with patient, showing index finger proximal phalanx fracture reduced with hardware in place in good position, no change from intraop fluoro.  [de-identified] : 32 year old male at one week status post the above procedure, doing well.\par \par -We discussed in detail the clinical and imaging findings\par -I reassured patient that he is healing well\par -I switched him to a radial gutter brace, he is NWB in the right index finger\par -He was advised to keep the UE elevated to help decrease swelling\par -He was advised to take over the counter medication for pain as needed if there is no contraindication. \par -I will see him in two weeks for new XRs of right hand (3 views), sooner as needed\par -Patient and family had the opportunity to ask questions and they were in agreement with the plan.\par

## 2022-08-23 ENCOUNTER — APPOINTMENT (OUTPATIENT)
Dept: ORTHOPEDIC SURGERY | Facility: CLINIC | Age: 32
End: 2022-08-23

## 2022-08-23 VITALS
SYSTOLIC BLOOD PRESSURE: 129 MMHG | HEART RATE: 65 BPM | DIASTOLIC BLOOD PRESSURE: 79 MMHG | TEMPERATURE: 97.9 F | OXYGEN SATURATION: 99 %

## 2022-08-23 PROCEDURE — 99024 POSTOP FOLLOW-UP VISIT: CPT

## 2022-09-20 ENCOUNTER — APPOINTMENT (OUTPATIENT)
Dept: ORTHOPEDIC SURGERY | Facility: CLINIC | Age: 32
End: 2022-09-20

## 2022-09-20 VITALS
HEART RATE: 53 BPM | BODY MASS INDEX: 37.03 KG/M2 | HEIGHT: 69 IN | WEIGHT: 250 LBS | DIASTOLIC BLOOD PRESSURE: 87 MMHG | SYSTOLIC BLOOD PRESSURE: 144 MMHG

## 2022-09-20 PROCEDURE — 99024 POSTOP FOLLOW-UP VISIT: CPT

## 2022-09-20 PROCEDURE — 73130 X-RAY EXAM OF HAND: CPT | Mod: RT

## 2022-09-20 NOTE — HISTORY OF PRESENT ILLNESS
[Doing Well] : is doing well [de-identified] : s/p Open reduction and internal fixation of right index finger proximal phalanx shaft displaced fracture.\par  Right index finger extensor tendon repair  DOS:08/05/22\par \par  [de-identified] : 09/20/2022 \par Mr. MIRIAN CHURCHILL returns  with his sister for follow up about six and a half weeks status post the above procedures.  Reports minimal pain at incision.  Denies fevers or chills.  Denies paresthesia.  No new complaints.  He recently started therapy and is having a lot of stiffness in the digit.\par \par 08/23/2022\par Mr. MIRIAN CHURCHILL returns with his sister for follow up about two and a half weeks status post the above procedures.  Reports minimal pain at incision.  Denies fevers or chills.  Denies paresthesia.  No new complaints.\par  \par 08/11/2022\par Mr. MIRIAN CHURCHILL returns with his sister for follow up about one week status post the above procedure.  Reports minimal pain at incision.  Denies fevers or chills.  Denies paresthesia.  No new complaints.  [de-identified] : GENERAL: Awake, alert, cooperative, answers questions appropriately. No acute distress.  \par SKIN: Warm, dry, intact. Color and turgor normal. \par LUNGS: Demonstrates unlabored breathing on room air with no accessory muscle use.\par EXTREMITIES: Warm and well-perfused. \par NEUROLOGICAL: Grossly intact.\par \par FOCUSED UPPER EXTREMITY EXAM:\par \par RUE:\par -Mild appropriate post op swelling in index finger\par -Incision and wound well healed with no erythema or drainage.  No signs of infection.\par -Able to flex and extend all digits except for index finger with moderate stiffness.\par -Sensation intact in all digits.\par -All digits warm and well perfused. \par  [de-identified] : X-rays of right hand (3 views) were obtained in Office ( 09/20/2022 ) and reviewed with patient, showing index finger proximal phalanx fracture reduced with hardware in place in good position, more fracture site remodeling although fracture avelina still noted, otherwise no change in alignment from previous imaging. [de-identified] : 32 year old male at six and a half weeks status post the above procedures, doing well.\par \par -We discussed in detail the clinical and imaging findings\par -I reassured patient that he is healing well\par -I renewed his therapy prescription\par -He was advised to keep the UE elevated to help decrease swelling\par -He was advised to take over the counter medication for pain as needed if there is no contraindication. \par -I will see him in four weeks for new XRs of right hand (3 views), sooner as needed\par -Patient and family had the opportunity to ask questions and they were in agreement with the plan.\par

## 2022-10-20 ENCOUNTER — APPOINTMENT (OUTPATIENT)
Dept: ORTHOPEDIC SURGERY | Facility: CLINIC | Age: 32
End: 2022-10-20

## 2022-10-20 VITALS — HEIGHT: 69 IN | BODY MASS INDEX: 37.03 KG/M2 | WEIGHT: 250 LBS

## 2022-10-20 DIAGNOSIS — S66.901D UNSPECIFIED INJURY OF UNSPECIFIED MUSCLE, FASCIA AND TENDON AT WRIST AND HAND LEVEL, RIGHT HAND, SUBSEQUENT ENCOUNTER: ICD-10-CM

## 2022-10-20 DIAGNOSIS — S61.212D LACERATION W/OUT FOREIGN BODY OF RIGHT MIDDLE FINGER W/OUT DAMAGE TO NAIL, SUBSEQUENT ENCOUNTER: ICD-10-CM

## 2022-10-20 DIAGNOSIS — S62.642A NONDISPLACED FRACTURE OF PROXIMAL PHALANX OF RIGHT MIDDLE FINGER, INITIAL ENCOUNTER FOR CLOSED FRACTURE: ICD-10-CM

## 2022-10-20 DIAGNOSIS — S62.610B: ICD-10-CM

## 2022-10-20 DIAGNOSIS — S61.210D LACERATION W/OUT FOREIGN BODY OF RIGHT INDEX FINGER W/OUT DAMAGE TO NAIL, SUBSEQUENT ENCOUNTER: ICD-10-CM

## 2022-10-20 PROCEDURE — 99024 POSTOP FOLLOW-UP VISIT: CPT

## 2022-10-20 PROCEDURE — 73130 X-RAY EXAM OF HAND: CPT | Mod: RT

## 2022-12-12 ENCOUNTER — NON-APPOINTMENT (OUTPATIENT)
Age: 32
End: 2022-12-12

## 2023-05-11 NOTE — H&P PST ADULT - PRO TOBACCO QUIT READY
May 11, 2023      Nicolas Stahl  [address] [place] [id number]        To Whom It May Concern,     Nicolas was here in the clinic  today for ED follow up.   He has been having episodes of severe abdominal pain and follow up with GI is out until August. He has a plan in place to control some of the symptoms but may need to be absent during flares.           Sincerely,        PEPE Joseph CNP           not motivated to quit

## 2023-06-17 ENCOUNTER — NON-APPOINTMENT (OUTPATIENT)
Age: 33
End: 2023-06-17

## 2023-09-08 NOTE — ED PROVIDER NOTE - NS ED MD DISPO DISCHARGE CCDA
PAST SURGICAL HISTORY:  History of cholecystectomy     
Patient/Caregiver provided printed discharge information.

## 2023-12-17 ENCOUNTER — NON-APPOINTMENT (OUTPATIENT)
Age: 33
End: 2023-12-17

## 2024-03-10 ENCOUNTER — NON-APPOINTMENT (OUTPATIENT)
Age: 34
End: 2024-03-10

## (undated) DEVICE — DRAPE C ARM UNIVERSAL

## (undated) DEVICE — BLANKET WARMER LOWER ADULT

## (undated) DEVICE — SUT VICRYL 0 27" CT-2 UNDYED

## (undated) DEVICE — SOL IRR POUR NS 0.9% 1000ML

## (undated) DEVICE — DRAPE HAND TABLE EXTENSION

## (undated) DEVICE — DRSG ESMARK 4"

## (undated) DEVICE — SUT MONOCRYL 4-0 27" PS-2 UNDYED

## (undated) DEVICE — WRAP COMPRESSION CALF MED

## (undated) DEVICE — SUT VICRYL 2-0 27" CT-2 UNDYED

## (undated) DEVICE — DRAPE SPLIT SHEETS 77X108"

## (undated) DEVICE — PACK EXTREMITY SOUTHSIDE

## (undated) DEVICE — DRSG STOCKINETTE TUBULAR COTTON 2PLY 4X60"

## (undated) DEVICE — SOL IRR POUR H2O 1000ML

## (undated) DEVICE — DRILL BIT MEDARTIS TWIST 1.2X25MM

## (undated) DEVICE — GLV 8 PROTEXIS